# Patient Record
Sex: MALE | HISPANIC OR LATINO | URBAN - METROPOLITAN AREA
[De-identification: names, ages, dates, MRNs, and addresses within clinical notes are randomized per-mention and may not be internally consistent; named-entity substitution may affect disease eponyms.]

---
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
## 2021-02-24 NOTE — ED ADULT NURSE NOTE - EXTENSIONS OF SELF_ADULT
None labs ok, ucg neg, UA + for UTI.  ct abd - no acute pathology.  to rx for uti, pt to f/u GI as outpt for further w/u, pt told to return to ER for worsening pain, fever, vomiting, or any other new/concerning symptoms.  pt understands and agrees with plan.

## 2021-06-04 NOTE — ED ADULT NURSE NOTE - PRO INTERPRETER NEED 2
English

## 2022-12-06 NOTE — ED ADULT NURSE NOTE - NS ED NURSE DC INFO COMPLEXITY
Daily Note     Today's date: 2022  Patient name: Wes Felix  : 1959  MRN: [id number]  Referring provider: Yessi Garcia MD  Dx:   Encounter Diagnosis     ICD-10-CM    1  Postlaminectomy syndrome, thoracic  M96 1       2  Chronic low back pain, unspecified back pain laterality, unspecified whether sciatica present  M54 50     G89 29                      Subjective: Pt reports his low back is slightly better  Objective: See treatment diary below      Assessment:  Pt still cautious about wanting to add more to program but this should happen next visit        Plan: Progress dynamic core stabilization                                                       Precautions:  Multi-Level cervical fusion       Manuals 22                                   Neuro Re-Ed         PPT 10" hold 20x 5" x20 5" x20 10" hold 2x10 10" 2x10   Bridges w/ isometric abdominals 2x10 5" hold 2x10  2x10 w/ 10" hold 2x10 w/ 10" hold   Supine marches w/ isometric abs 3x10 3x10 darion  3x10 darion 3x10 30x Darion   Hip add iso w/core stab   5" x20 5" x20    Hip abd w/core stab    5" x20 grn  5" x20 Green    Anti-rotations Green 2x10 bial Green 2x10 bilat              Ther Ex        [address] [facility]    8' L2    DKTC 10" x12 10"x12 SKTC w/strap 10"x12 darion  10" x12 10" x12   LTR 10" x12 10"x12 10'x12 darion  10" x12 10" x12   Supine hamstring stretch 10" x12 10"x12 darion  10"x12 darion  10" x12 10" x12                   Pt Ed/ HEP Pathology and involved anatomy as well as isuing and reviewing of HEP- 15'               Ther Activity                        Gait Training                        Modalities Simple: Patient demonstrates quick and easy understanding